# Patient Record
Sex: FEMALE
[De-identification: names, ages, dates, MRNs, and addresses within clinical notes are randomized per-mention and may not be internally consistent; named-entity substitution may affect disease eponyms.]

---

## 2021-02-14 ENCOUNTER — HOSPITAL ENCOUNTER (EMERGENCY)
Dept: HOSPITAL 53 - M ED | Age: 31
Discharge: HOME | End: 2021-02-14
Payer: COMMERCIAL

## 2021-02-14 VITALS — SYSTOLIC BLOOD PRESSURE: 119 MMHG | DIASTOLIC BLOOD PRESSURE: 71 MMHG

## 2021-02-14 VITALS — BODY MASS INDEX: 25.44 KG/M2 | HEIGHT: 69 IN | WEIGHT: 171.74 LBS

## 2021-02-14 DIAGNOSIS — Z91.018: ICD-10-CM
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
## 2021-02-14 DIAGNOSIS — T21.07XA: Primary | ICD-10-CM
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
## 2021-02-14 DIAGNOSIS — Z79.899: ICD-10-CM

## 2021-09-24 ENCOUNTER — HOSPITAL ENCOUNTER (EMERGENCY)
Dept: HOSPITAL 53 - M ED | Age: 31
Discharge: LEFT BEFORE DISCHARGE | End: 2021-09-24
Payer: COMMERCIAL

## 2021-09-24 VITALS — BODY MASS INDEX: 24.33 KG/M2 | WEIGHT: 164.24 LBS | HEIGHT: 69 IN

## 2021-09-24 VITALS — DIASTOLIC BLOOD PRESSURE: 58 MMHG | SYSTOLIC BLOOD PRESSURE: 120 MMHG

## 2021-09-24 DIAGNOSIS — Z53.21: Primary | ICD-10-CM
